# Patient Record
Sex: FEMALE | Race: WHITE
[De-identification: names, ages, dates, MRNs, and addresses within clinical notes are randomized per-mention and may not be internally consistent; named-entity substitution may affect disease eponyms.]

---

## 2018-06-04 ENCOUNTER — HOSPITAL ENCOUNTER (EMERGENCY)
Dept: HOSPITAL 17 - NEPD | Age: 23
LOS: 1 days | Discharge: HOME | End: 2018-06-05
Payer: MEDICAID

## 2018-06-04 VITALS
OXYGEN SATURATION: 100 % | DIASTOLIC BLOOD PRESSURE: 88 MMHG | RESPIRATION RATE: 18 BRPM | TEMPERATURE: 98.3 F | HEART RATE: 80 BPM | SYSTOLIC BLOOD PRESSURE: 123 MMHG

## 2018-06-04 VITALS — WEIGHT: 200.62 LBS | HEIGHT: 68 IN | BODY MASS INDEX: 30.41 KG/M2

## 2018-06-04 DIAGNOSIS — Z3A.09: ICD-10-CM

## 2018-06-04 DIAGNOSIS — O23.41: Primary | ICD-10-CM

## 2018-06-04 DIAGNOSIS — R07.89: ICD-10-CM

## 2018-06-04 LAB
ALBUMIN SERPL-MCNC: 4.1 GM/DL (ref 3.4–5)
ALP SERPL-CCNC: 57 U/L (ref 45–117)
ALT SERPL-CCNC: 42 U/L (ref 10–53)
AMORPHOUS SEDIMENT, URINE: (no result)
AST SERPL-CCNC: 19 U/L (ref 15–37)
BACTERIA #/AREA URNS HPF: (no result) /HPF
BASOPHILS # BLD AUTO: 0 TH/MM3 (ref 0–0.2)
BASOPHILS NFR BLD: 0.4 % (ref 0–2)
BILIRUB SERPL-MCNC: 0.8 MG/DL (ref 0.2–1)
BUN SERPL-MCNC: 13 MG/DL (ref 7–18)
CALCIUM SERPL-MCNC: 9.5 MG/DL (ref 8.5–10.1)
CHLORIDE SERPL-SCNC: 105 MEQ/L (ref 98–107)
COLOR UR: YELLOW
CREAT SERPL-MCNC: 0.81 MG/DL (ref 0.5–1)
EOSINOPHIL # BLD: 0.3 TH/MM3 (ref 0–0.4)
EOSINOPHIL NFR BLD: 2.5 % (ref 0–4)
ERYTHROCYTE [DISTWIDTH] IN BLOOD BY AUTOMATED COUNT: 12.5 % (ref 11.6–17.2)
GFR SERPLBLD BASED ON 1.73 SQ M-ARVRAT: 89 ML/MIN (ref 89–?)
GLUCOSE SERPL-MCNC: 87 MG/DL (ref 74–106)
GLUCOSE UR STRIP-MCNC: (no result) MG/DL
HCO3 BLD-SCNC: 24.8 MEQ/L (ref 21–32)
HCT VFR BLD CALC: 42.2 % (ref 35–46)
HGB BLD-MCNC: 14.4 GM/DL (ref 11.6–15.3)
HGB UR QL STRIP: (no result)
KETONES UR STRIP-MCNC: (no result) MG/DL
LYMPHOCYTES # BLD AUTO: 3.8 TH/MM3 (ref 1–4.8)
LYMPHOCYTES NFR BLD AUTO: 35.2 % (ref 9–44)
MCH RBC QN AUTO: 29.8 PG (ref 27–34)
MCHC RBC AUTO-ENTMCNC: 34.1 % (ref 32–36)
MCV RBC AUTO: 87.2 FL (ref 80–100)
MONOCYTE #: 0.7 TH/MM3 (ref 0–0.9)
MONOCYTES NFR BLD: 6.4 % (ref 0–8)
MUCOUS THREADS #/AREA URNS LPF: (no result) /LPF
NEUTROPHILS # BLD AUTO: 6 TH/MM3 (ref 1.8–7.7)
NEUTROPHILS NFR BLD AUTO: 55.5 % (ref 16–70)
NITRITE UR QL STRIP: (no result)
PLATELET # BLD: 283 TH/MM3 (ref 150–450)
PMV BLD AUTO: 9.4 FL (ref 7–11)
PROT SERPL-MCNC: 7.9 GM/DL (ref 6.4–8.2)
RBC # BLD AUTO: 4.84 MIL/MM3 (ref 4–5.3)
SODIUM SERPL-SCNC: 139 MEQ/L (ref 136–145)
SP GR UR STRIP: 1.02 (ref 1–1.03)
SQUAMOUS #/AREA URNS HPF: 1 /HPF (ref 0–5)
TROPONIN I SERPL-MCNC: (no result) NG/ML (ref 0.02–0.05)
URINE LEUKOCYTE ESTERASE: (no result)
WBC # BLD AUTO: 10.9 TH/MM3 (ref 4–11)

## 2018-06-04 PROCEDURE — 99285 EMERGENCY DEPT VISIT HI MDM: CPT

## 2018-06-04 PROCEDURE — 87086 URINE CULTURE/COLONY COUNT: CPT

## 2018-06-04 PROCEDURE — 87186 SC STD MICRODIL/AGAR DIL: CPT

## 2018-06-04 PROCEDURE — 84484 ASSAY OF TROPONIN QUANT: CPT

## 2018-06-04 PROCEDURE — 86900 BLOOD TYPING SEROLOGIC ABO: CPT

## 2018-06-04 PROCEDURE — 81001 URINALYSIS AUTO W/SCOPE: CPT

## 2018-06-04 PROCEDURE — 87591 N.GONORRHOEAE DNA AMP PROB: CPT

## 2018-06-04 PROCEDURE — 93005 ELECTROCARDIOGRAM TRACING: CPT

## 2018-06-04 PROCEDURE — 87491 CHLMYD TRACH DNA AMP PROBE: CPT

## 2018-06-04 PROCEDURE — 84702 CHORIONIC GONADOTROPIN TEST: CPT

## 2018-06-04 PROCEDURE — 71045 X-RAY EXAM CHEST 1 VIEW: CPT

## 2018-06-04 PROCEDURE — 86850 RBC ANTIBODY SCREEN: CPT

## 2018-06-04 PROCEDURE — 87210 SMEAR WET MOUNT SALINE/INK: CPT

## 2018-06-04 PROCEDURE — 86901 BLOOD TYPING SEROLOGIC RH(D): CPT

## 2018-06-04 PROCEDURE — 87077 CULTURE AEROBIC IDENTIFY: CPT

## 2018-06-04 PROCEDURE — 76856 US EXAM PELVIC COMPLETE: CPT

## 2018-06-04 PROCEDURE — 85025 COMPLETE CBC W/AUTO DIFF WBC: CPT

## 2018-06-04 PROCEDURE — 80053 COMPREHEN METABOLIC PANEL: CPT

## 2018-06-04 NOTE — PD
HPI


Chief Complaint:  Pregnancy Related Problem


Time Seen by Provider:  21:20


Travel History


International Travel<30 days:  No


Contact w/Intl Traveler<30days:  No


Traveled to known affect area:  No





History of Present Illness


HPI


22yo F with no PMH presents to the ED with multiple complaints today.  She is 

mainly here because she is [id number] pregnant by LMP of 3/28/18 but she went 

to a local clinic and they did an ultrasound and said they could not see 

anything so told her to follow up with OBGYN.  She said her OBGYN wont see her 

until July.  She has been having intermittent abdominal cramping.  Has some 

white vaginal discharge.  +Nausea.  Denies any current vaginal bleeding but had 

vaginal spotting a few weeks ago.   Said she also has midsternal chest pain 

that comes and goes and is sharp.  Said she currently does not have any chest 

pain.  Denies any sob, fever, vomiting, dysuria, hematuria, focal weakness or 

numbness.





PFSH


Past Medical History


Pregnant?:  Pregnant





Social History


Alcohol Use:  No


Tobacco Use:  No





Allergies-Medications


(Allergen,Severity, Reaction):  


Coded Allergies:  


     latex (Unverified  Allergy, Severe, 6/4/18)


Reported Meds & Prescriptions





Reported Meds & Active Scripts


Active


Oxycodone/Acetaminophen 5 mg/325 mg 1 Tab Tab 1 Tab PO Q4H PRN


Reported


Gummi Bear Multivitamin/M (Pediatric Multiple Vitamin W/) Multivit Chw    








Review of Systems


Except as stated in HPI:  all other systems reviewed are Neg





Physical Exam


Narrative


GENERAL: 22yo F not in distress.


SKIN: Focused skin assessment warm/dry.


HEAD: Atraumatic. Normocephalic. 


EYES: Pupils equal and round. No scleral icterus. No injection or drainage. 


ENT: No nasal bleeding or discharge.  Mucous membranes pink and moist.


NECK: Trachea midline. No JVD. 


CARDIOVASCULAR: Regular rate and rhythm.  No murmur appreciated.


RESPIRATORY: No accessory muscle use. Clear to auscultation. Breath sounds 

equal bilaterally. 


GASTROINTESTINAL: Abdomen soft, non-tender, nondistended.


PELVIC:


MUSCULOSKELETAL: No obvious deformities. No clubbing.  No cyanosis.  No edema. 


NEUROLOGICAL: Awake and alert. No obvious cranial nerve deficits.  Motor 

grossly within normal limits. Normal speech.


PSYCHIATRIC: Appropriate mood and affect; insight and judgment normal.





Data


Data


Last Documented VS





Vital Signs








  Date Time  Temp Pulse Resp B/P (MAP) Pulse Ox O2 Delivery O2 Flow Rate FiO2


 


6/4/18 18:45 98.3 80 18 123/88 (100) 100   








Orders





 Orders


Complete Blood Count With Diff (6/4/18 21:34)


Comprehensive Metabolic Panel (6/4/18 21:34)


Gc And Chlamydia Pcr (6/4/18 21:34)


Type And Screen (6/4/18 21:34)


Wet Prep Profile (6/4/18 21:34)


Urinalysis - C+S If Indicated (6/4/18 21:34)


Beta Hcg (Quant/Titer) (6/4/18 21:34)


Troponin I (6/4/18 21:34)


Electrocardiogram (6/4/18 )


Urine Culture (6/4/18 22:00)


Us Pelvis Comp Gyn/Non-Preg (6/4/18 )


Chest, Single Ap (6/4/18 )


Nitrofurantoin Monohyd Macrocr (Macrobid (6/4/18 23:45)





Labs





Laboratory Tests








Test


  6/4/18


22:00 6/4/18


22:10 6/4/18


22:37


 


Urine Color YELLOW   


 


Urine Turbidity HAZY   


 


Urine pH 5.5   


 


Urine Specific Gravity 1.020   


 


Urine Protein NEG mg/dL   


 


Urine Glucose (UA) NEG mg/dL   


 


Urine Ketones NEG mg/dL   


 


Urine Occult Blood NEG   


 


Urine Nitrite NEG   


 


Urine Bilirubin NEG   


 


Urine Urobilinogen


  LESS THAN 2.0


MG/DL 


  


 


 


Urine Leukocyte Esterase LARGE   


 


Urine RBC 2 /hpf   


 


Urine WBC 26 /hpf   


 


Urine Squamous Epithelial


Cells 1 /hpf 


  


  


 


 


Urine Amorphous Sediment RARE   


 


Urine Bacteria MOD /hpf   


 


Urine Mucus FEW /lpf   


 


Microscopic Urinalysis Comment


  CULTURE


INDICATED 


  


 


 


White Blood Count  10.9 TH/MM3  


 


Red Blood Count  4.84 MIL/MM3  


 


Hemoglobin  14.4 GM/DL  


 


Hematocrit  42.2 %  


 


Mean Corpuscular Volume  87.2 FL  


 


Mean Corpuscular Hemoglobin  29.8 PG  


 


Mean Corpuscular Hemoglobin


Concent 


  34.1 % 


  


 


 


Red Cell Distribution Width  12.5 %  


 


Platelet Count  283 TH/MM3  


 


Mean Platelet Volume  9.4 FL  


 


Neutrophils (%) (Auto)  55.5 %  


 


Lymphocytes (%) (Auto)  35.2 %  


 


Monocytes (%) (Auto)  6.4 %  


 


Eosinophils (%) (Auto)  2.5 %  


 


Basophils (%) (Auto)  0.4 %  


 


Neutrophils # (Auto)  6.0 TH/MM3  


 


Lymphocytes # (Auto)  3.8 TH/MM3  


 


Monocytes # (Auto)  0.7 TH/MM3  


 


Eosinophils # (Auto)  0.3 TH/MM3  


 


Basophils # (Auto)  0.0 TH/MM3  


 


CBC Comment  DIFF FINAL  


 


Differential Comment    


 


Blood Urea Nitrogen  13 MG/DL  


 


Creatinine  0.81 MG/DL  


 


Random Glucose  87 MG/DL  


 


Total Protein  7.9 GM/DL  


 


Albumin  4.1 GM/DL  


 


Calcium Level  9.5 MG/DL  


 


Alkaline Phosphatase  57 U/L  


 


Aspartate Amino Transf


(AST/SGOT) 


  19 U/L 


  


 


 


Alanine Aminotransferase


(ALT/SGPT) 


  42 U/L 


  


 


 


Total Bilirubin  0.8 MG/DL  


 


Sodium Level  139 MEQ/L  


 


Potassium Level  3.7 MEQ/L  


 


Chloride Level  105 MEQ/L  


 


Carbon Dioxide Level  24.8 MEQ/L  


 


Anion Gap  9 MEQ/L  


 


Estimat Glomerular Filtration


Rate 


  89 ML/MIN 


  


 


 


Troponin I


  


  LESS THAN 0.02


NG/ML 


 


 


Human Chorionic Gonadotropin,


Quant 


  LESS THAN 1


MIU/ML 


 


 


Clue Cells (Wet Prep)   NONE SEEN 


 


Vaginal Trichomonas (Wet Prep)   NONE SEEN 


 


Vaginal Yeast (Wet Prep)   NONE SEEN 











MDM


Medical Decision Making


Medical Screen Exam Complete:  Yes


Emergency Medical Condition:  Yes


Interpretation(s)


EKG: NSR 71bpm.  Normal axis.  Incomplete RBBB.  No significant ST elevation or 

depression.


Differential Diagnosis


Ectopic pregnancy vs. miscarriage vs. musculoskeletal pain


Narrative Course


22yo F who is 9 weeks 5 days pregnant here because her ultrasound at local 

clinic did not show anything.  She also has atypical chest pain for days.  She 

is not tachycardic and saturating at 100% on RA so low suspicion for PE.  PERC 

negative.  Labs reviewed, no leukocytosis.  H/H normal.  Troponin negative.  

CXR negative.  CMP unremarkable.  bHCG is less than 1.  Pt is not pregnant.  UA 

showed WBC 26, will treat.  Wet prep negative.  US pelvic is unremarkable.  Pt'

s chest pain is very atypical, do not think it is cardiac.  Pt likely had 

miscarriage as her positive pregnancy at home was in April and she had some 

vaginal spotting.  Blood type A+.  Return precautions given.





Diagnosis





 Primary Impression:  


 UTI (lower urinary tract infection)


 Additional Impression:  


 Atypical chest pain


Patient Instructions:  General Instructions


Departure Forms:  Tests/Procedures





***Additional Instructions:  


Please follow up with your primary care physician in 2-3 days.  Return to the 

ED if symptoms worsen.


***Med/Other Pt SpecificInfo:  Prescription(s) given


Scripts


Acetaminophen (Tylenol) 325 Mg Tab


650 MG PO Q6H Y for PAIN SCALE 1 TO 4, #20 TAB 0 Refills


   Prov: Akilah Wall DO         6/5/18 


Nitrofurantoin Monohydrate Macrocrystals (Macrobid) 100 Mg Capsule


100 MG PO BID for Infection for 5 Days, #10 CAP 0 Refills


   Prov: Akilah Wall DO         6/5/18


Disposition:  01 DISCHARGE HOME


Condition:  Stable











Akilah Wall DO Jun 4, 2018 21:43

## 2018-06-04 NOTE — RADRPT
EXAM DATE:  6/4/2018 11:32 PM EDT

AGE/SEX:        23 years / Female



INDICATIONS:  Patient states that she is 9 weeks pregnant with intermittent abdominal cramping.



CLINICAL DATA:  This is the patient's initial encounter. Patient reports that signs and symptoms have
 been present for 1 day and indicates a pain score of 0/10. 

                                                                          

MEDICAL/SURGICAL HISTORY:       Pregnancy. None.



COMPARISON:      No prior [facility] exams available for comparison.



MEASUREMENTS:

Uterus:__7.7 x 4.7 x 3.2 cm

Endometrial Stripe:__2 mm

Right Ovary:__ 3.2 x 1.4 x 1.5 cm

Left Ovary:__ 2.7 x 2.1 x 1.2 cm



FINDINGS:

Uterus:  The myometrium has homogeneous echotexture without mass.  The endometrial cavity is empty.

Right Ovary:  The right ovary is grossly unremarkable.

Left Ovary:  The left ovary is grossly unremarkable.

Other: No free fluid.  



CONCLUSION: 

1.  Unremarkable pelvic ultrasound.



Electronically signed by: Rich Puente MD  6/4/2018 11:41 PM EDT

## 2018-06-05 NOTE — RADRPT
EXAM DATE:  6/5/2018 12:03 AM EDT

AGE/SEX:        23 years / Female



INDICATIONS:  Short of breath, chest pain.



CLINICAL DATA:  This is the patient's initial encounter. Patient reports that signs and symptoms have
 been present for 1 day and indicates a pain score of 6/10. 

                                                                          

MEDICAL/SURGICAL HISTORY:       None. None.



COMPARISON:      No prior [facility] exams available for comparison.



FINDINGS:  

A single AP view of the chest demonstrates the lungs to be symmetrically aerated without evidence of 
mass, infiltrate or effusion.  The cardiomediastinal contours are unremarkable.  Osseous structures a
re intact.  





CONCLUSION: 

No acute intrathoracic disease.



Electronically signed by: Rich Puente MD  6/5/2018 12:08 AM EDT

## 2018-06-05 NOTE — EKG
Date Performed: 06/04/2018       Time Performed: 23:25:36

 

PTAGE:      23 years

 

EKG:      Sinus rhythm 

 

 WITH OCCASIONAL ECTOPIC PREMATURE COMPLEXES INCOMPLETE RIGHT BUNDLE BRANCH BLOCK BORDERLINE ECG 

 

NO PREVIOUS TRACING            

 

DOCTOR:   Rosmery Robins  Interpretating Date/Time  06/05/2018 15:20:29